# Patient Record
Sex: MALE | Race: WHITE | NOT HISPANIC OR LATINO | Employment: FULL TIME | URBAN - METROPOLITAN AREA
[De-identification: names, ages, dates, MRNs, and addresses within clinical notes are randomized per-mention and may not be internally consistent; named-entity substitution may affect disease eponyms.]

---

## 2024-12-12 ENCOUNTER — APPOINTMENT (OUTPATIENT)
Dept: UROLOGY | Facility: CLINIC | Age: 34
End: 2024-12-12
Payer: COMMERCIAL

## 2024-12-12 VITALS — HEART RATE: 79 BPM | DIASTOLIC BLOOD PRESSURE: 72 MMHG | SYSTOLIC BLOOD PRESSURE: 138 MMHG | TEMPERATURE: 96 F

## 2024-12-12 DIAGNOSIS — N50.3 CYST OF EPIDIDYMIS: ICD-10-CM

## 2024-12-12 DIAGNOSIS — N50.819 PAIN IN TESTICLE, UNSPECIFIED LATERALITY: Primary | ICD-10-CM

## 2024-12-12 LAB
POC APPEARANCE, URINE: CLEAR
POC BILIRUBIN, URINE: NEGATIVE
POC BLOOD, URINE: NEGATIVE
POC COLOR, URINE: YELLOW
POC GLUCOSE, URINE: NEGATIVE MG/DL
POC KETONES, URINE: NEGATIVE MG/DL
POC LEUKOCYTES, URINE: NEGATIVE
POC NITRITE,URINE: NEGATIVE
POC PH, URINE: 7 PH
POC PROTEIN, URINE: ABNORMAL MG/DL
POC SPECIFIC GRAVITY, URINE: 1.02
POC UROBILINOGEN, URINE: 0.2 EU/DL

## 2024-12-12 PROCEDURE — 81003 URINALYSIS AUTO W/O SCOPE: CPT | Performed by: NURSE PRACTITIONER

## 2024-12-12 PROCEDURE — 99204 OFFICE O/P NEW MOD 45 MIN: CPT | Performed by: NURSE PRACTITIONER

## 2024-12-12 NOTE — PROGRESS NOTES
"Subjective   Patient ID: Daniel Bolton \"Anjum\" is a 34 y.o. male who presents for Testicle Pain.  Patient presents with 2 month history of left testicular pain. Denies changes in appearance of testicle, however he had significant increase in tenderness of testicle. States this persisted for 2-3 weeks. Improved around the end of Oct, unsure why. States he has had minimal symptoms since that time. States during the time the pain was occurring he was able to palpate a small \"marble\" on the left testicle that was not present on the right. States he has never had this pain previously. Any kind of pressure caused exacerbation of pain.     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 Some exercise, recently in PT for ligament issues in his ankle.    Patient has 3 kids under 4 with  in Oct 2024.         Review of Systems   All other systems reviewed and are negative.      Objective   Physical Exam  Vitals reviewed.   Genitourinary:     Penis: Normal and circumcised.       Testes:         Right: Tenderness or swelling not present.         Left: Mass and tenderness present. Swelling not present.      Epididymis:      Right: Normal.      Left: Mass present.      Comments: 2-3 epididymal cysts on the left     Constitutional: Patient generally appears stated age. Good nutrition. No deformities. Good attention to grooming.  Neck: No neck masses. Good symmetry. No crepitus. Normal thyroid size and consistency with no masses.  Respiratory: Normal respiratory effort. No intercostal retractions. No use of accessory muscles.  Cardiovascular: Examination of the peripheral vascular system reveals no swelling or varicosities with good pulses. Normal extremity temperature, no edema or tenderness.  Abdomen: Examination of the abdomen reveals no masses or tenderness. No hernias detected. Normal liver and spleen size.   Lymphatic: No palpable lymph nodes in the neck, axilla, groin or " other locations  Skin: Inspection of the skin reveals no rashes, lesions, ulcers.  Neurologic/Psychiatric: Oriented to time, place and person. Normal mood and affect with no depression, anxiety, or agitation.    Office Visit on 12/12/2024   Component Date Value Ref Range Status    POC Color, Urine 12/12/2024 Yellow  Straw, Yellow, Light-Yellow Final    POC Appearance, Urine 12/12/2024 Clear  Clear Final    POC Glucose, Urine 12/12/2024 NEGATIVE  NEGATIVE mg/dl Final    POC Bilirubin, Urine 12/12/2024 NEGATIVE  NEGATIVE Final    POC Ketones, Urine 12/12/2024 NEGATIVE  NEGATIVE mg/dl Final    POC Specific Gravity, Urine 12/12/2024 1.025  1.005 - 1.035 Final    POC Blood, Urine 12/12/2024 NEGATIVE  NEGATIVE Final    POC PH, Urine 12/12/2024 7.0  No Reference Range Established PH Final    POC Protein, Urine 12/12/2024 TRACE (A)  NEGATIVE, 30 (1+) mg/dl Final    POC Urobilinogen, Urine 12/12/2024 0.2  0.2, 1.0 EU/DL Final    Poc Nitrite, Urine 12/12/2024 NEGATIVE  NEGATIVE Final    POC Leukocytes, Urine 12/12/2024 NEGATIVE  NEGATIVE Final       Assessment/Plan   Diagnoses and all orders for this visit:  Pain in testicle, unspecified laterality  -     POCT UA Automated manually resulted  -     US scrotum; Future  Cyst of epididymis    Given reassurance at this time regarding symptoms. Plan for further follow up after scrotal ultrasound. Patient is in agreement with plan.        RON Gifford-CNP 12/12/24 4:03 PM

## 2024-12-16 ENCOUNTER — HOSPITAL ENCOUNTER (OUTPATIENT)
Dept: RADIOLOGY | Facility: CLINIC | Age: 34
Discharge: HOME | End: 2024-12-16
Payer: COMMERCIAL

## 2024-12-16 DIAGNOSIS — N50.819 PAIN IN TESTICLE, UNSPECIFIED LATERALITY: ICD-10-CM

## 2024-12-16 PROCEDURE — 76870 US EXAM SCROTUM: CPT

## 2024-12-16 PROCEDURE — 76870 US EXAM SCROTUM: CPT | Performed by: RADIOLOGY

## 2025-01-14 ENCOUNTER — APPOINTMENT (OUTPATIENT)
Dept: UROLOGY | Facility: CLINIC | Age: 35
End: 2025-01-14
Payer: COMMERCIAL

## 2025-01-14 DIAGNOSIS — N50.819 PAIN IN TESTICLE, UNSPECIFIED LATERALITY: ICD-10-CM

## 2025-01-14 DIAGNOSIS — N50.3 CYST OF EPIDIDYMIS: Primary | ICD-10-CM

## 2025-01-14 PROCEDURE — 99213 OFFICE O/P EST LOW 20 MIN: CPT | Performed by: NURSE PRACTITIONER

## 2025-01-14 NOTE — PROGRESS NOTES
"Subjective   Patient ID: Daniel Bolton \"Anjum\" is a 34 y.o. male who presents for Testicle Pain.  Patient presents for follow up of left testicular pain. Denies changes in appearance of testicle, however he had significant increase in tenderness of testicle. States this persisted for 2-3 weeks. Improved around the end of Oct, unsure why. States he has had minimal symptoms since that time. States during the time the pain was occurring he was able to palpate a small \"marble\" on the left testicle that was not present on the right. States he has never had this pain previously. Any kind of pressure caused exacerbation of pain.      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 Some exercise, recently in PT for ligament issues in his ankle.     Patient has 3 kids under 4 with  in Oct 2024.       Symptoms remain minimally bothersome at present.  He returns today after completion of scrotal ultrasound          Review of Systems   All other systems reviewed and are negative.      Objective   Physical Exam  Unable to connect via [facility]    === 24 ===    US SCROTUM    - Impression -  4 x 3 x 4 mm left epididymal head cyst.    Small left varicocele.    No sign of intratesticular mass or torsion.      MACRO:  None    Signed by: Romulo Blanca 2024 6:12 PM  Dictation workstation:   [id number]    Assessment/Plan   Diagnoses and all orders for this visit:  Cyst of epididymis  -     US scrotum; Future  Pain in testicle, unspecified laterality    Reviewed ultrasound today.  Benign findings.  There is an inconsistency regarding the presence of a hydrocele versus varicocele in the body of the exam versus the impression.  I will reach out to the interpreting radiologist for clarification.  We will plan for scrotal ultrasound in 1 year for surveillance.  Patient verbalized understanding.       Holley Licona, RON-CNP 25 9:49 AM   "